# Patient Record
(demographics unavailable — no encounter records)

---

## 2025-05-01 NOTE — REASON FOR VISIT
[Symptom and Test Evaluation] : symptom and test evaluation [Hyperlipidemia] : hyperlipidemia [Coronary Artery Disease] : coronary artery disease [Other: ____] : [unfilled]

## 2025-05-01 NOTE — CARDIOLOGY SUMMARY
[___] : [unfilled] [LVEF ___%] : LVEF [unfilled]% [None] : normal LV function [Normal] : normal LA size [Mild] : mild mitral regurgitation [de-identified] : November 5, 2021.  Normal sinus rhythm  1/30/23 nsr  April 5, 2024.  Normal sinus rhythm May 1, 2025.  Normal sinus rhythm [de-identified] : Event monitoring.  November 5, 2021.  Extrasystolic arrhythmias.  Short runs of atrial tachycardia. [de-identified] : 4/5/2024.  LVEF 65% fibrocalcific aortic and mitral valve.  Mild MR.  Mild TR. [de-identified] : Coronary calcium score.  November 19, 2021.  Atherosclerosis of aorta and LAD.  Total coronary calcium score of 30.

## 2025-05-01 NOTE — HISTORY OF PRESENT ILLNESS
[FreeTextEntry1] : 71 year-old female comes in for follow-up consultation to review her labs and medication management in presence of coronary calcific atherosclerosis.  Reviewed labs, echocardiogram  She has been aggressive with her diet and exercise.   No unusual shortness of breath. No chest pain.  No PND orthopnea pedal edema no further syncopal event. No claudication. No recent hospital admission  Medical history mainly significant for 1.  Equivocal exercise treadmill stress test.  Asymptomatic.  Preserved LV systolic function.  Has opted for continued lifestyle changes.  Understands risk benefits and other options for further evaluation. 2.  Hypercholesterolemia.  Elevated LDL cholesterol.  Excellent HDL cholesterol.  Has decided with lifestyle modification.   3.  History of elevated hemoglobin A1c.  Prediabetes.  Improved with lifestyle modifications. 4.  Syncopal event.  Vasovagal.  Preserved LV systolic function.  No significant abnormality of event monitoring in the past. #5 calcific coronary and aortic atherosclerosis.

## 2025-05-01 NOTE — DISCUSSION/SUMMARY
[EKG obtained to assist in diagnosis and management of assessed problem(s)] : EKG obtained to assist in diagnosis and management of assessed problem(s) [FreeTextEntry1] : 71-year-old female with above medical history and active medical problems as noted below which were reviewed during this virtual visit 1.  Syncopal event no recurrent event. She will continue with appropriate hydration Avoid alcohol intake Lower caffeine intake Event monitor showed extrasystolic arrhythmia.  Short runs of atrial tachycardia.  Asymptomatic.  Pathophysiology reviewed.  Symptomatic episodes we will discuss further regarding use of medications like beta-blockers or electrophysiology guided therapy. If recurrent symptoms she will contact us or call 911.  In that case she would benefit from implantable loop recorder. Avoid prolonged standing and quick changes in position. #2 calcific atherosclerosis.  Aortic and LAD.  Risk benefits alternatives of statin therapy have been reviewed.  Aspirin 81 mg discussed.  Associated side effects risk and benefits were reviewed.  Aggressive lifestyle modifications.  Change in clinical status may require her to consider invasive or noninvasive coronary angiography guided therapy.  Limitation of the evaluation management discussed.   Rosuvastatin 20 mg.  If LDL cholesterol is greater than 70 we will have to discuss further regarding increasing dose or addition of Zetia 10 mg. Echocardiogram will be repeated to evaluate subclinical change in LV ejection fraction #3 dyslipidemia.On rosuvastatin 20 mg.  Management as discussed above. 4.  ASCVD.  Age.  Recommended carotid Doppler study and abdominal aortic ultrasound for Assessment of subclinical atherosclerotic vascular disease .  Counseling regarding low saturated fat, salt and carbohydrate intake was reviewed. Active lifestyle and regular. Exercise along with weight management is advised. All the above were at length reviewed. Answered all the questions. Thank you very much for this kind referral. Please do not hesitate to give me a call for any question. Part of this transcription was done with voice recognition software and phonetically similar errors are common. I apologize for that. Please do not hesitate to call for any questions due to above.  Sincerely, Caroline Luz MD,FACC,FASE  For prevention of future cardiovascular events and associated morbidity mortality.  Risk benefits side effects reviewed.  She has agreed to increase dose of rosuvastatin 20 mg as prescribed by your office.  Reviewed again importance.  Repeat labs in 6 to 8 weeks.  If persistent elevation in spite of higher dose of rosuvastatin may need to consider addition of Zetia 10 mg to the present regimen. 4  Hyperglycemia / borderline hemoglobin A1c to suggest prediabetes in the past now improved with lifestyle modifications.  Continue to follow lifestyle modification and your recommendations.   Counseling regarding low saturated fat, salt and carbohydrate intake was reviewed. Active lifestyle and regular. Exercise along with weight management is advised. All the above were at length reviewed. Answered all the questions. Thank you very much for this kind referral. Please do not hesitate to give me a call for any question. Part of this transcription was done with voice recognition software and phonetically similar errors are common. I apologize for that. Please do not hesitate to call for any questions due to above.  Sincerely,  Caroline Luz MD,FACC,[facility]

## 2025-05-01 NOTE — ASSESSMENT
[FreeTextEntry1] : Reviewed on April 5, 2024. EKG as noted above Echocardiogram reviewed. Labs from March 29, 2024.  Hemoglobin 11.7 HDL 83  triglycerides 38 CMP stable.  TSH normal.  Reviewed on May 1, 2025.  EKG from today reviewed. Most recent labs reviewed.  Sodium 141 potassium 4.2.  Creatinine 0.59.  LFT normal.  Hemoglobin A1c 5.9.  VLDL 10.  I do not have LDL cholesterol

## 2025-05-01 NOTE — ADDENDUM
[FreeTextEntry1] : Labs were received after her office visit when she left.  LDL cholesterol 105 HDL was 79.  She would like to continue with aggressive lifestyle.  She would like to recheck it at next office visit and decide about appropriate further management.  Understands risk points alternatives and options.

## 2025-05-01 NOTE — PHYSICAL EXAM
[Well Developed] : well developed [No Acute Distress] : no acute distress [Carotid Bruit] : carotid bruit [Normal S1, S2] : normal S1, S2 [Murmur] : murmur [Clear Lung Fields] : clear lung fields [Normal Gait] : normal gait [No Edema] : no edema [Normal Radial B/L] : normal radial B/L [Normal DP B/L] : normal DP B/L [Normal Speech] : normal speech [Alert and Oriented] : alert and oriented